# Patient Record
Sex: MALE | Race: WHITE | NOT HISPANIC OR LATINO | Employment: UNEMPLOYED | URBAN - NONMETROPOLITAN AREA
[De-identification: names, ages, dates, MRNs, and addresses within clinical notes are randomized per-mention and may not be internally consistent; named-entity substitution may affect disease eponyms.]

---

## 2019-02-20 ENCOUNTER — OFFICE VISIT (OUTPATIENT)
Dept: SURGERY | Facility: CLINIC | Age: 44
End: 2019-02-20

## 2019-02-20 VITALS
HEART RATE: 71 BPM | BODY MASS INDEX: 25.18 KG/M2 | WEIGHT: 170 LBS | SYSTOLIC BLOOD PRESSURE: 115 MMHG | HEIGHT: 69 IN | DIASTOLIC BLOOD PRESSURE: 80 MMHG

## 2019-02-20 DIAGNOSIS — D17.21 LIPOMA OF RIGHT UPPER EXTREMITY: Primary | ICD-10-CM

## 2019-02-20 PROCEDURE — 11402 EXC TR-EXT B9+MARG 1.1-2 CM: CPT | Performed by: SURGERY

## 2019-02-20 PROCEDURE — 11401 EXC TR-EXT B9+MARG 0.6-1 CM: CPT | Performed by: SURGERY

## 2019-02-20 PROCEDURE — 99242 OFF/OP CONSLTJ NEW/EST SF 20: CPT | Performed by: SURGERY

## 2019-02-21 NOTE — PROGRESS NOTES
Subjective   Raul Houston is a 43 y.o. male is being seen for consultation today at the request of No ref. provider found    43 y.o. male presenting for evaluation of skin lesion. Lesion on Right upper extremity with patient's observations stated as increasing diameter, increasing thickness, exam of this area shows lipoma, features Subcutaneous round ovoid mass is measuring 2 cm and 1 cm respectively with one of the lateral upper extremity overlying the biceps musculature and one on the forearm injuring 1 cm on the extensor surface of the right forearm., size 20 &10 mm.    History reviewed. No pertinent past medical history.    History reviewed. No pertinent family history.    Social History     Socioeconomic History   • Marital status: Unknown     Spouse name: Not on file   • Number of children: Not on file   • Years of education: Not on file   • Highest education level: Not on file   Social Needs   • Financial resource strain: Not on file   • Food insecurity - worry: Not on file   • Food insecurity - inability: Not on file   • Transportation needs - medical: Not on file   • Transportation needs - non-medical: Not on file   Occupational History   • Not on file   Tobacco Use   • Smoking status: Never Smoker   • Smokeless tobacco: Never Used   Substance and Sexual Activity   • Alcohol use: No     Frequency: Never   • Drug use: No   • Sexual activity: No   Other Topics Concern   • Not on file   Social History Narrative   • Not on file       History reviewed. No pertinent surgical history.    Review of Systems   Constitutional: Negative for activity change, appetite change, chills and fever.   HENT: Negative for sore throat and trouble swallowing.    Eyes: Negative for visual disturbance.   Respiratory: Negative for cough and shortness of breath.    Cardiovascular: Negative for chest pain and palpitations.   Gastrointestinal: Negative for abdominal distention, abdominal pain, blood in stool, constipation, diarrhea,  "nausea and vomiting.   Endocrine: Negative for cold intolerance and heat intolerance.   Genitourinary: Negative for dysuria.   Musculoskeletal: Negative for joint swelling.   Skin: Negative for color change, rash and wound.   Allergic/Immunologic: Negative for immunocompromised state.   Neurological: Negative for dizziness, seizures, weakness and headaches.   Hematological: Negative for adenopathy. Does not bruise/bleed easily.   Psychiatric/Behavioral: Negative for agitation and confusion.         /80   Pulse 71   Ht 175.3 cm (69\")   Wt 77.1 kg (170 lb)   BMI 25.10 kg/m²   Objective   Physical Exam   Constitutional: He is oriented to person, place, and time. He appears well-developed.   HENT:   Head: Normocephalic and atraumatic.   Mouth/Throat: Mucous membranes are normal.   Eyes: Conjunctivae are normal. Pupils are equal, round, and reactive to light.   Neck: Neck supple. No JVD present. No tracheal deviation present. No thyromegaly present.   Cardiovascular: Normal rate and regular rhythm. Exam reveals no gallop and no friction rub.   No murmur heard.  Pulmonary/Chest: Effort normal and breath sounds normal.   Abdominal: Soft. He exhibits no distension. There is no splenomegaly or hepatomegaly. There is no tenderness. No hernia.   Musculoskeletal: Normal range of motion. He exhibits no deformity.   Neurological: He is alert and oriented to person, place, and time.   Skin: Skin is warm and dry.   Right upper extremity with a 2 cm lipoma of the area overlying the biceps musculature, 1 cm lipoma involving the extensor surface of the right forearm.   Psychiatric: He has a normal mood and affect.     Excision of lipoma ×2 right upper extremity due to increasing size.  Upper lipoma measures 2 cm, lower lipoma once meter    Area overlying right upper extremity lipomas was prepped and draped in usual sterile fashion.  Timeout procedure was performed.  Local anesthetic was infiltrated.  Overlying the lipomas a " vertically oriented incision was made and dissection was carried down to the lipoma and this was removed from surrounding tissues.  It was hemostatic and closed with absorbable suture and dressed with sure close.  Lipomas measured 2 similar meter once meter respectively.  Patient tolerated the procedure well.    Estimated blood loss: 5 mL    Specimens: Lipoma ×2    Complications: None    Raul was seen today for lipoma of r arm.    Diagnoses and all orders for this visit:    Lipoma of right upper extremity        Assessment     Raul Houston is a 43 y.o. male with growing bothersome lipomas of the right upper extremity ×2.  These have been removed in the office today and he will follow-up in 2 weeks to discuss pathology.  Patient's Body mass index is 25.1 kg/m². BMI is within normal parameters. No follow-up required..

## 2019-03-11 ENCOUNTER — OFFICE VISIT (OUTPATIENT)
Dept: SURGERY | Facility: CLINIC | Age: 44
End: 2019-03-11

## 2019-03-11 VITALS — HEIGHT: 69 IN | BODY MASS INDEX: 25.18 KG/M2 | WEIGHT: 170 LBS

## 2019-03-11 DIAGNOSIS — D17.21 LIPOMA OF RIGHT UPPER EXTREMITY: Primary | ICD-10-CM

## 2019-03-11 PROCEDURE — 99212 OFFICE O/P EST SF 10 MIN: CPT | Performed by: SURGERY

## 2019-03-12 NOTE — PROGRESS NOTES
Subjective   Raul Houston is a 43 y.o. male  is here today for follow-up.         Raul Houston is a 43 y.o. male here for follow up after removal of lipoma x2 from the right upper extremity.  His incisions have healed well with no evidence of complication or recurrence.  Final pathology is consistent with benign lipoma.            Assessment     Raul was seen today for s/p lipoma excision.    Diagnoses and all orders for this visit:    Lipoma of right upper extremity      Raul Houston is a 43 y.o. male status post lipoma excision of the right upper extremity x2.  Final pathology is consistent with benign lipoma.  His incisions have healed well without complication or recurrence.  He will follow-up as needed.

## 2021-04-05 ENCOUNTER — IMMUNIZATION (OUTPATIENT)
Dept: VACCINE CLINIC | Facility: HOSPITAL | Age: 46
End: 2021-04-05

## 2021-04-05 PROCEDURE — 0001A: CPT | Performed by: INTERNAL MEDICINE

## 2021-04-05 PROCEDURE — 91300 HC SARSCOV02 VAC 30MCG/0.3ML IM: CPT | Performed by: INTERNAL MEDICINE

## 2021-04-26 ENCOUNTER — IMMUNIZATION (OUTPATIENT)
Dept: VACCINE CLINIC | Facility: HOSPITAL | Age: 46
End: 2021-04-26

## 2021-04-26 PROCEDURE — 91300 HC SARSCOV02 VAC 30MCG/0.3ML IM: CPT | Performed by: INTERNAL MEDICINE

## 2021-04-26 PROCEDURE — 0002A: CPT | Performed by: INTERNAL MEDICINE
